# Patient Record
Sex: MALE | Race: BLACK OR AFRICAN AMERICAN | Employment: FULL TIME | URBAN - METROPOLITAN AREA
[De-identification: names, ages, dates, MRNs, and addresses within clinical notes are randomized per-mention and may not be internally consistent; named-entity substitution may affect disease eponyms.]

---

## 2019-04-30 ENCOUNTER — HOSPITAL ENCOUNTER (EMERGENCY)
Age: 24
Discharge: HOME | End: 2019-04-30
Payer: COMMERCIAL

## 2019-04-30 ENCOUNTER — APPOINTMENT (OUTPATIENT)
Dept: GENERAL RADIOLOGY | Age: 24
End: 2019-04-30
Payer: COMMERCIAL

## 2019-04-30 VITALS
HEIGHT: 69 IN | BODY MASS INDEX: 44.43 KG/M2 | OXYGEN SATURATION: 97 % | RESPIRATION RATE: 18 BRPM | DIASTOLIC BLOOD PRESSURE: 86 MMHG | HEART RATE: 70 BPM | TEMPERATURE: 97.8 F | WEIGHT: 300 LBS | SYSTOLIC BLOOD PRESSURE: 134 MMHG

## 2019-04-30 DIAGNOSIS — M77.8 RIGHT WRIST TENDONITIS: Primary | ICD-10-CM

## 2019-04-30 PROCEDURE — 99283 EMERGENCY DEPT VISIT LOW MDM: CPT

## 2019-04-30 PROCEDURE — 73130 X-RAY EXAM OF HAND: CPT

## 2019-04-30 PROCEDURE — 73110 X-RAY EXAM OF WRIST: CPT

## 2019-04-30 PROCEDURE — 6370000000 HC RX 637 (ALT 250 FOR IP): Performed by: PHYSICIAN ASSISTANT

## 2019-04-30 RX ORDER — PREDNISONE 20 MG/1
60 TABLET ORAL ONCE
Status: COMPLETED | OUTPATIENT
Start: 2019-04-30 | End: 2019-04-30

## 2019-04-30 RX ORDER — PREDNISONE 20 MG/1
40 TABLET ORAL EVERY MORNING
Qty: 10 TABLET | Refills: 0 | Status: SHIPPED | OUTPATIENT
Start: 2019-04-30 | End: 2019-05-05

## 2019-04-30 RX ADMIN — PREDNISONE 60 MG: 20 TABLET ORAL at 17:12

## 2019-04-30 ASSESSMENT — PAIN DESCRIPTION - ORIENTATION: ORIENTATION: RIGHT

## 2019-04-30 ASSESSMENT — PAIN DESCRIPTION - LOCATION: LOCATION: HAND;WRIST

## 2019-04-30 ASSESSMENT — PAIN SCALES - GENERAL: PAINLEVEL_OUTOF10: 9

## 2019-04-30 ASSESSMENT — PAIN DESCRIPTION - DESCRIPTORS: DESCRIPTORS: SHARP

## 2019-04-30 ASSESSMENT — PAIN DESCRIPTION - PAIN TYPE: TYPE: ACUTE PAIN

## 2019-04-30 NOTE — ED PROVIDER NOTES
Independent:      HPI:  4/30/19, Time: 4:45PM.       Darlin Merino is a 22 y.o. male presenting to the ED for evaluation of right hand and wrist pain and swelling onset over the last 24 hours. 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 He did this repetitively his entire shift and yesterday was his first day. He reports that he has been having persistent pain and swelling to his right hand and wrist and he called his company and they advised to get himself checked. He denies any fall, trauma or injury. Her reports no numbness or tingling to his right hand. The complaint has been persistent, moderate in severity, and worsened by movement and palpation. ROS:   Pertinent positives and negatives are stated within the HPI, all other systems reviewed and are negative.    --------------------------------------------- PAST HISTORY ---------------------------------------------  Past Medical History:  has a past medical history of Asthma. Past Surgical History:  has no past surgical history on file. Social History:  reports that he is a non-smoker but has been exposed to tobacco smoke. He has never used smokeless tobacco. He reports that he does not drink alcohol or use drugs. Family History: family history is not on file. The patients home medications have been reviewed. Allergies: Patient has no known allergies. -------------------------------------------------- RESULTS -------------------------------------------------  All laboratory and radiology results have been personally reviewed by myself   LABS:  No results found for this visit on 04/30/19. RADIOLOGY:  Interpreted by Radiologist.  XR HAND RIGHT (MIN 3 VIEWS)   Final Result   No radiographic evidence of acute osseous abnormality or   fracture. . If there is persistent clinical pain or symptomatology, a   return to medical attention within 2-7 days and further imaging is   recommended. XR WRIST RIGHT (MIN 3 VIEWS)   Final Result      NO ACUTE FRACTURE OR DISLOCATION RIGHT WRIST.              ------------------------- NURSING NOTES AND VITALS REVIEWED ---------------------------   The nursing notes within the ED encounter and vital signs as below have been reviewed. /86   Pulse 70   Temp 97.8 °F (36.6 °C) (Oral)   Resp 18   Ht 5' 9\" (1.753 m)   Wt 300 lb (136.1 kg)   SpO2 97%   BMI 44.30 kg/m²   Oxygen Saturation Interpretation: Normal    ---------------------------------------------------PHYSICAL EXAM--------------------------------------      Constitutional/General: Alert and oriented x3, well appearing, non toxic in NAD  Head: NC/AT  Eyes: PERRL, EOMI  Neck: Supple, full ROM  Pulmonary: Lungs clear to auscultation bilaterally,  Not in respiratory distress  Cardiovascular:  Regular rate and rhythm,  2+ distal pulses  Extremities: Moves all extremities x 4. Local tenderness to right hand and right wrist with marked STS noted. Capillary refill brisk and sensory intact to all digits of his right hand. No tenderness to medial aspect of right wrist anatomical snuffbox. Patient able to supinate and pronate right hand with some discomfort. Warm and well perfused  Skin: warm and dry without rash  Neurologic: GCS 15  Psych: Normal Affect    ------------------------------ ED COURSE/MEDICAL DECISION MAKING----------------------  Medications   predniSONE (DELTASONE) tablet 60 mg (60 mg Oral Given 4/30/19 1712)       Medical Decision Making:    Patient to ER, complaints of right hand and right wrist pain and swelling onset since new job yesterday. Recommend to check xrays of right wrist and right hand. Patient advised of stable x-ray findings. Recommended ice, elevate, oral prednisone, wrist velcro splint. Recommend close follow up with [facility], call for appointment tomorrow.     Recommend Prednisone once every morning for 5 days, ice and work restrictions given. Counseling: The emergency provider has spoken with the patient and discussed todays results, in addition to providing specific details for the plan of care and counseling regarding the diagnosis and prognosis. Questions are answered at this time and they are agreeable with the plan.      --------------------------------- IMPRESSION AND DISPOSITION ---------------------------------    IMPRESSION  1.  Right wrist tendonitis        DISPOSITION  Disposition: Discharge to home  Patient condition is stable        [place], [place]  04/30/19 8815

## 2019-05-20 ENCOUNTER — HOSPITAL ENCOUNTER (OUTPATIENT)
Dept: OCCUPATIONAL THERAPY | Age: 24
Setting detail: THERAPIES SERIES
Discharge: HOME | End: 2019-05-20
Payer: COMMERCIAL

## 2019-05-20 NOTE — PROGRESS NOTES
OCCUPATIONAL THERAPY INITIAL EVALUATION     Phone: [phone]  Fax: [phone]     Date:  2019  Initial Evaluation Date: 19    Patient Name:  Angelika Beckman    :  1995    Restrictions/Precautions:  none, low fall risk  Diagnosis:  Strain R thumb (V56.942F)      Insurance/Certification information:    Referring Practitioner:  Dr. Noy Pacheco  Date of Surgery/Injury: 19  Plan of care signed (Y/N):  no  Visit# / total visits:    End date 19    Past Medical History:   Past Medical History:   Diagnosis Date    Asthma     exercise induced     Past Surgical History: No past surgical history on file. Reason for Referral: pt. Reports while lifting a piece of ham at [employer] wrist gave out and began feeling thumb and wrist pain in the right hand. Home Living: lives with mother. Prior Level of Function: independent    Pain Level: 5 on scale of 1-10, aching    Cognition:   Alert/Oriented x3     IADL History  Homemaking Responsibility: mod ind. With less use of the right hand. Shopping Responsibility: mod ind. Mode of Transportation: pt drives  Leisure & Hobbies: limited  Work: pt. Not working. ADL  Feeding:  ind.  Grooming:  ind  Bathing:  ind  UE Dressing:  ind  LE Dressing:  ind  Toileting:  ind  Transfer:  ind    UE Assessment:    Comment: Hand Dominance is right    Sensation: intact to light touch. Circumferential Measurements:            No swelling is noted. Dynamometer (setting 2): Pt. Reports he is unable to lift anything heavy. If he does he feels increased pain in wrist area.     Left: 80#      Right: 118#    Pinch Meter:   Lateral: Left= 21#, Right= 20#    Palmar 3 point: Left= 14#, Right= 19#  9 Hole Peg Test:   Left: WNL   Right: WNL    Intervention: fluidotherapy    Assessment of current deficits   Functional mobility []  ADLs [x] Strength [x]  Cognition []  Functional transfers  [] IADLs [] Safety Awareness [x]  Endurance [x]  Fine Motor right hand without pain. 3) Patient will report ADL functions same as prior to diagnosis of thumb and wrist strain. Shawna Jenkins 4)Patient will demonstrate improved functional activity tolerance from mild to max for ADL/IADL completion. Patient. Education:  [x] Plans/Goals, Risks/Benefits discussed  [x] Home exercise program  Method of Education: [x] Verbal  [] Demo  [] Written  Comprehension of Education:  [x] Verbalizes understanding. [x] Demonstrates understanding. [] Needs Review. [] Demonstrates/verbalizes understanding of HEP/Ed previously given. Patient understands diagnosis/prognosis and consents to treatment, plan and goals: [x] Yes    [] No      Electronically signed by: Meghann REICH CHT [id number]      Physician's Certification / Comments      Frequency/Duration 2 / week for 6 visits. Certification period From: 5/3/19  To: 6/30/19     I have reviewed the Plan of Care established for skilled therapy services and certify that the services are required and that they will be provided while the patient is under my care. Physician's Comments/Revisions:                   Practioner's Printed Name:  Meghann REICH CHT                               Physician's Signature:                                                               Date: 5/20/19         Please review Patient's OT evaluation and if you agree sign/date and fax back to us at our [address] [place] [address] fax # [phone].

## 2019-05-23 ENCOUNTER — HOSPITAL ENCOUNTER (OUTPATIENT)
Dept: OCCUPATIONAL THERAPY | Age: 24
Setting detail: THERAPIES SERIES
Discharge: HOME | End: 2019-05-23
Payer: COMMERCIAL

## 2019-05-23 PROCEDURE — 97110 THERAPEUTIC EXERCISES: CPT

## 2019-05-23 PROCEDURE — 97530 THERAPEUTIC ACTIVITIES: CPT

## 2019-05-23 PROCEDURE — 97140 MANUAL THERAPY 1/> REGIONS: CPT

## 2019-05-23 NOTE — PROGRESS NOTES
OCCUPATIONAL THERAPY PROGRESS NOTE      [facility]   Phone: [phone]   Fax: [phone]     Date:  2019  Initial Evaluation Date:  2019    Patient Name:  Roseanne Lau    :  1995  Diagnosis:  Strain R thumb   (K51.097Z)                                            Insurance/Certification information:  [facility]  [id number]  To 2019  12 visits  Referring Practitioner:  Dr. Benjamin Berrios  Date of Surgery/Injury: 19  Plan of care signed (Y/N):  no  Visit# / total visits:    End date 19     Pain Level: moderate, aching and tight (pulling)    Subjective: \"It locks up on me at times. \"     Objective:  Engaged patient in the following with focus on ROM, pain control, edema management. INTERVENTION: COMPLETED REPS/TIME: SPECIFICS/COMMENTS:   Modality:      Fluidotherapy x 15 SROM  AROM   Paraffin      MHP      Estim      Manual Therapy:      Scar Massage      Soft Tissue: x 20 min MFR  Cross friction   Therapeutic Ex/Activities: Towel Task x 12 reps    Prayer Stretches x 10 reps                                                                ROM:      Tendon Glided x 10 reps    Nerve Glides x 10 reps    Thumb x 5 min Abd/add flex/ext  AROM                 Assessment: Patient shows potential to progress with stated goals and will be educated on HEP and provided written handouts as needed throughout their care. Pt is making Fair progress toward stated plan of care. -Rehab Potential: Good  -Requires OT Follow Up: Yes  -Time In: 1245  -Time Out: 145   Timed Treatment Minutes: 55 Minutes  Goals: Goals for pt can be see on initial eval occurring on 2019    Plan:   [x]  Continues Plan of care: Treatment delivered based on POC and graduated to patient's progress. Patient education continues at each visit to obtain maximum benefit from skilled OT intervention.   []  Alter Plan of care:   []  Discharge:      LIBBY Pickard/ANKUR, [address]

## 2019-05-29 ENCOUNTER — HOSPITAL ENCOUNTER (OUTPATIENT)
Dept: OCCUPATIONAL THERAPY | Age: 24
Setting detail: THERAPIES SERIES
End: 2019-05-29
Payer: COMMERCIAL

## 2019-06-11 ENCOUNTER — HOSPITAL ENCOUNTER (OUTPATIENT)
Dept: OCCUPATIONAL THERAPY | Age: 24
Setting detail: THERAPIES SERIES
Discharge: HOME | End: 2019-06-11
Payer: COMMERCIAL

## 2019-06-11 NOTE — PROGRESS NOTES
OCCUPATIONAL THERAPY DEPARTMENT    LETTER OF DISCHARGE NOTIFICATION    6/11/2019    Dear Dr. Hernan Rahman  : This is to inform you that, as per [address] [facility] your patient, Darlin Merino, [id number],   is as of todays date being discharged from Occupational Therapy secondary to the following reasons:      1. If an Outpatient Occupational Therapy patient misses three consecutive scheduled appointments without any prior notification, he or she will be discharged from Occupational Therapy services. A new prescription will be necessary to resume Occupational Therapy. 2.  If a client is absent for 50% of scheduled visits during a one-month period, he or she will be discharged from Occupational Therapy services. A new prescription will be necessary to resume Occupational Therapy. 3.  Due to their poor attendence, current goal status was not available. He attended the initial evaluation and one visit of 12 scheduled. Missed 3+. If you have any questions, feel free to call us at [address] [place] [address], [phone]. Thank you     Lyssa Torrez, [id number] [place] [address], [address]  [phone] (FAX)      ______________________________  ___________  Physician      Date    I have read the above notification and understand this discharge of POC.

## 2019-10-20 ENCOUNTER — HOSPITAL ENCOUNTER (EMERGENCY)
Age: 24
Discharge: HOME | End: 2019-10-20
Attending: EMERGENCY MEDICINE

## 2019-10-20 VITALS
DIASTOLIC BLOOD PRESSURE: 82 MMHG | SYSTOLIC BLOOD PRESSURE: 130 MMHG | RESPIRATION RATE: 18 BRPM | BODY MASS INDEX: 44.43 KG/M2 | HEART RATE: 83 BPM | HEIGHT: 69 IN | TEMPERATURE: 98 F | OXYGEN SATURATION: 98 % | WEIGHT: 300 LBS

## 2019-10-20 DIAGNOSIS — K29.00 OTHER ACUTE GASTRITIS WITHOUT HEMORRHAGE: Primary | ICD-10-CM

## 2019-10-20 PROCEDURE — 96375 TX/PRO/DX INJ NEW DRUG ADDON: CPT

## 2019-10-20 PROCEDURE — 6360000002 HC RX W HCPCS: Performed by: EMERGENCY MEDICINE

## 2019-10-20 PROCEDURE — 96374 THER/PROPH/DIAG INJ IV PUSH: CPT

## 2019-10-20 PROCEDURE — 6370000000 HC RX 637 (ALT 250 FOR IP): Performed by: EMERGENCY MEDICINE

## 2019-10-20 PROCEDURE — 99283 EMERGENCY DEPT VISIT LOW MDM: CPT

## 2019-10-20 PROCEDURE — 2580000003 HC RX 258: Performed by: EMERGENCY MEDICINE

## 2019-10-20 RX ORDER — ONDANSETRON 2 MG/ML
4 INJECTION INTRAMUSCULAR; INTRAVENOUS ONCE
Status: COMPLETED | OUTPATIENT
Start: 2019-10-20 | End: 2019-10-20

## 2019-10-20 RX ORDER — PANTOPRAZOLE SODIUM 40 MG/1
40 TABLET, DELAYED RELEASE ORAL ONCE
Status: COMPLETED | OUTPATIENT
Start: 2019-10-20 | End: 2019-10-20

## 2019-10-20 RX ORDER — 0.9 % SODIUM CHLORIDE 0.9 %
1000 INTRAVENOUS SOLUTION INTRAVENOUS ONCE
Status: COMPLETED | OUTPATIENT
Start: 2019-10-20 | End: 2019-10-20

## 2019-10-20 RX ORDER — KETOROLAC TROMETHAMINE 30 MG/ML
30 INJECTION, SOLUTION INTRAMUSCULAR; INTRAVENOUS ONCE
Status: COMPLETED | OUTPATIENT
Start: 2019-10-20 | End: 2019-10-20

## 2019-10-20 RX ADMIN — LIDOCAINE HYDROCHLORIDE: 20 SOLUTION ORAL; TOPICAL at 03:15

## 2019-10-20 RX ADMIN — KETOROLAC TROMETHAMINE 30 MG: 30 INJECTION, SOLUTION INTRAMUSCULAR; INTRAVENOUS at 04:20

## 2019-10-20 RX ADMIN — SODIUM CHLORIDE 1000 ML: 9 INJECTION, SOLUTION INTRAVENOUS at 04:17

## 2019-10-20 RX ADMIN — ONDANSETRON 4 MG: 2 INJECTION INTRAMUSCULAR; INTRAVENOUS at 04:20

## 2019-10-20 RX ADMIN — PANTOPRAZOLE SODIUM 40 MG: 40 TABLET, DELAYED RELEASE ORAL at 03:39

## 2019-10-20 ASSESSMENT — PAIN DESCRIPTION - FREQUENCY: FREQUENCY: INTERMITTENT

## 2019-10-20 ASSESSMENT — PAIN DESCRIPTION - ORIENTATION: ORIENTATION: UPPER

## 2019-10-20 ASSESSMENT — PAIN DESCRIPTION - LOCATION: LOCATION: ABDOMEN

## 2019-10-20 ASSESSMENT — PAIN SCALES - GENERAL
PAINLEVEL_OUTOF10: 6
PAINLEVEL_OUTOF10: 6
PAINLEVEL_OUTOF10: 8

## 2021-11-14 ENCOUNTER — HOSPITAL ENCOUNTER (EMERGENCY)
Age: 26
Discharge: LEFT BEFORE DISCHARGE | End: 2021-11-14

## 2021-11-14 ENCOUNTER — APPOINTMENT (OUTPATIENT)
Dept: GENERAL RADIOLOGY | Age: 26
End: 2021-11-14

## 2021-11-14 VITALS
BODY MASS INDEX: 41.52 KG/M2 | SYSTOLIC BLOOD PRESSURE: 146 MMHG | HEIGHT: 70 IN | WEIGHT: 290 LBS | RESPIRATION RATE: 16 BRPM | HEART RATE: 98 BPM | TEMPERATURE: 97 F | OXYGEN SATURATION: 98 % | DIASTOLIC BLOOD PRESSURE: 90 MMHG

## 2021-11-14 DIAGNOSIS — Z20.822 EXPOSURE TO COVID-19 VIRUS: Primary | ICD-10-CM

## 2021-11-14 DIAGNOSIS — Z20.822 ENCOUNTER FOR LABORATORY TESTING FOR COVID-19 VIRUS: ICD-10-CM

## 2021-11-14 PROCEDURE — 99284 EMERGENCY DEPT VISIT MOD MDM: CPT

## 2021-11-14 PROCEDURE — 71045 X-RAY EXAM CHEST 1 VIEW: CPT

## 2021-11-14 PROCEDURE — U0003 INFECTIOUS AGENT DETECTION BY NUCLEIC ACID (DNA OR RNA); SEVERE ACUTE RESPIRATORY SYNDROME CORONAVIRUS 2 (SARS-COV-2) (CORONAVIRUS DISEASE [COVID-19]), AMPLIFIED PROBE TECHNIQUE, MAKING USE OF HIGH THROUGHPUT TECHNOLOGIES AS DESCRIBED BY CMS-2020-01-R: HCPCS

## 2021-11-14 PROCEDURE — U0005 INFEC AGEN DETEC AMPLI PROBE: HCPCS

## 2021-11-14 NOTE — Clinical Note
Amita Ledezma was seen and treated in our emergency department on 11/14/2021. COVID19 virus is suspected. Per the CDC guidelines we recommend home isolation until the following conditions are all met:    1. At least 10 days have passed since symptoms first appeared and  2. At least 24 hours have passed since last fever without the use of fever-reducing medications and  3. Symptoms (e.g., cough, shortness of breath) have improved    If you have any questions or concerns, please don't hesitate to call.     He may return to work/school on 11/23/2021        Julius Leon, NARESH - CNP

## 2021-11-14 NOTE — ED PROVIDER NOTES
[address]  Department of Emergency Medicine   ED  Encounter Note  Admit Date/RoomTime: 2021  6:29 PM  ED Room:     NAME: Josie Davis  : 1995  MRN: [id number]     Chief Complaint:  Concern For COVID-19 (roommate tested positive today, patient concerned nd wants tested, co slight cough  no other symptoms)    History of Present Illness       Josie Davis is a 23 y.o. old male who presents to the emergency department for COVID concern with symptoms beginning 1 day(s) prior to arrival. He have been taking no OTC intervention to alleviate their symptoms which seem to be aggravated by cold air. Symptoms are mild in severity and have been persistent in nature with the following pertinent positive and negatives:       Fever/Chills/Malaise    No      Rhinorrhea/Congestion    Yes      Loss taste or smell    No      Sore throat    Yes      Cough    Yes, productive clear-white      N/V/D/Abdominal pain    No      Syncope/CP/SOB/     Hemoptysis/Leg swelling    No       COVID-19 High- Risk Factors:     DM:    No      HTN:    No      CAD/CHF    No      Lung disease: Yes      Kidney disease:    No      CA/immunosuppressed:    No      Pregnant:    N/A      KNOWN EXPOSURE TO COVID-19: roommate is positive  COVID-19 VACCINE STATUS: none  EMPLOYMENT: grocery     ROS   Pertinent positives and negatives are stated within HPI, all other systems reviewed and are negative. Past Medical History:  has a past medical history of Asthma. Surgical History:  has no past surgical history on file. Social History:  reports that he is a non-smoker but has been exposed to tobacco smoke. He has never used smokeless tobacco. He reports that he does not drink alcohol and does not use drugs. Family History: family history is not on file. Allergies: Patient has no known allergies.     Physical Exam   Oxygen Saturation Interpretation: Normal on room air analysis. ED Triage Vitals   BP Temp Temp Source Pulse Resp SpO2 Height Weight   11/14/21 1826 11/14/21 1826 11/14/21 1826 11/14/21 1826 11/14/21 1826 11/14/21 1826 11/14/21 1838 11/14/21 1838   (!) 167/94 97 °F (36.1 °C) Temporal 98 16 98 % 5' 10\" (1.778 m) 290 lb (131.5 kg)         · Constitutional:  Alert, development consistent with age. No apparent distress. · Ears:  External Ears: No pain on manipulation of the tragus and pinna bilaterally. No mastoid tenderness bilaterally. TM's & External Canals: normal TM's and external ear canals bilaterally. · Nose:   There is clear rhinorrhea, mucosal erythema and mucosal edema. · Mouth:  No buccal lesions. Mucosa moist.   · Throat: Airway Patent. No tonsillar hypertrophy, erythema or exudate. No palatal petechiae or PTA. · Neck:  Supple. There is no preauricular, anterior cervical and posterior cervical node tenderness. · Respiratory:   Lung sounds clear and equal bilaterally. No wheezing or rhonchi. . No stridor or respiratory distress. · CV:  Regular rate and rhythm, no murmur. No resting tachycardia. Strong radial pulse. · GI:  Active BS. Abdomen soft, non-tender, non-distended. No firm or pulsatile mass. · Integument:  Normal turgor and normal capillary refill. No cyanosis. Warm and dry without visible rash. No calf tenderness, palpable cords or localized foot/ankle edema bilaterally. · Neurological:  Alert and oriented. Motor functions intact. Full sensation. Lab / Imaging Results   (All laboratory and radiology results have been personally reviewed by myself)  Labs:  No results found for this visit on 11/14/21. Imaging: All Radiology results interpreted by Radiologist unless otherwise noted. XR CHEST PORTABLE   Final Result   No acute process.              ED Course / Medical Decision Making   Medications - No data to display     Re-examination:  11/14/21       Time: 1910   Patients condition remains unchanged and remains stable. Discussed results and treatment plan. Consults:   None    Procedures:   none    Medical Decision Making:     Brayden Jo presented to ED with complaints of Concern For COVID-19 (roommate tested positive today, patient concerned nd wants tested, co slight cough  no other symptoms)      Imaging was done. Interpretation as per radiologist shows no acute process. He is not hypoxic. There is high suspicion for COVID-19, therefore, testing was obtained and results are pending. Patient does have a history of asthma however has had no wheezing and none on clinical exam therefore was not started on steroids. He denies need for an albuterol inhaler refill. Based on the history and physical examination findings, the causative nature of illness is likely to be COVID-19 in etiology. Therefore, symptomatic control with supportive meds, bronchodilators PRN and recommend self quarantine for 10 days are considered appropriate at this time. Patient is well appearing, non toxic and appropriate for outpatient management as listed below:    Normal progression of disease discussed. All questions answered. Explained the rationale for symptomatic treatment rather than use of an antibiotic. Instruction provided in the use of fluids, vaporizer, acetaminophen, and other OTC medication for symptom control. Extra fluids  Analgesics as needed, dosages and times reviewed. Denies need for refill of albuterol. No wheezing on exam therefore no steroids. Follow-up with primary care provider in 10 days, or sooner should symptoms worsen. Be sure to have a BP recheck as well and patient aware of his elevated BP. Explained specific instructions on when to return for re-evaluation should symptoms or condition worsen  Recommend quarantine for 10 days.   Monoclonal Antibody Form faxed after patient shows interest.    Patient verbalizes understanding of instructions, is amenable to this outpatient management plan and departed in stable condition. Assessment     1. Exposure to COVID-19 virus    2. Encounter for laboratory testing for COVID-19 virus      Plan   Discharged home. Patient condition is stable    New Medications     New Prescriptions    No medications on file     Electronically signed by NARESH Goldberg CNP   DD: 11/14/21  **This report was transcribed using voice recognition software. Every effort was made to ensure accuracy; however, inadvertent computerized transcription errors may be present.   END OF ED PROVIDER NOTE       NARESH Goldberg CNP  11/14/21 9095

## 2021-11-15 PROBLEM — U07.1 COVID: Status: ACTIVE | Noted: 2021-11-15

## 2021-11-16 LAB — SARS-COV-2, PCR: NOT DETECTED

## 2023-09-13 ENCOUNTER — HOSPITAL ENCOUNTER (EMERGENCY)
Age: 28
Discharge: HOME | End: 2023-09-13
Attending: EMERGENCY MEDICINE

## 2023-09-13 ENCOUNTER — APPOINTMENT (OUTPATIENT)
Dept: GENERAL RADIOLOGY | Age: 28
End: 2023-09-13

## 2023-09-13 VITALS
TEMPERATURE: 97.9 F | DIASTOLIC BLOOD PRESSURE: 83 MMHG | OXYGEN SATURATION: 98 % | HEIGHT: 69 IN | HEART RATE: 55 BPM | SYSTOLIC BLOOD PRESSURE: 154 MMHG | BODY MASS INDEX: 31.84 KG/M2 | WEIGHT: 215 LBS | RESPIRATION RATE: 16 BRPM

## 2023-09-13 DIAGNOSIS — S93.402A SPRAIN OF LEFT ANKLE, UNSPECIFIED LIGAMENT, INITIAL ENCOUNTER: Primary | ICD-10-CM

## 2023-09-13 DIAGNOSIS — R03.0 ELEVATED BLOOD PRESSURE READING: ICD-10-CM

## 2023-09-13 DIAGNOSIS — S93.602A FOOT SPRAIN, LEFT, INITIAL ENCOUNTER: ICD-10-CM

## 2023-09-13 PROCEDURE — 99283 EMERGENCY DEPT VISIT LOW MDM: CPT

## 2023-09-13 PROCEDURE — 6370000000 HC RX 637 (ALT 250 FOR IP): Performed by: EMERGENCY MEDICINE

## 2023-09-13 PROCEDURE — 73610 X-RAY EXAM OF ANKLE: CPT

## 2023-09-13 PROCEDURE — 73630 X-RAY EXAM OF FOOT: CPT

## 2023-09-13 RX ORDER — IBUPROFEN 800 MG/1
800 TABLET ORAL EVERY 8 HOURS PRN
Qty: 15 TABLET | Refills: 0 | Status: SHIPPED | OUTPATIENT
Start: 2023-09-13 | End: 2023-09-18

## 2023-09-13 RX ORDER — IBUPROFEN 600 MG/1
600 TABLET ORAL ONCE
Status: COMPLETED | OUTPATIENT
Start: 2023-09-13 | End: 2023-09-13

## 2023-09-13 RX ADMIN — IBUPROFEN 600 MG: 600 TABLET ORAL at 01:04

## 2023-09-13 ASSESSMENT — PAIN DESCRIPTION - ONSET
ONSET: ON-GOING
ONSET: PROGRESSIVE

## 2023-09-13 ASSESSMENT — PAIN - FUNCTIONAL ASSESSMENT
PAIN_FUNCTIONAL_ASSESSMENT: PREVENTS OR INTERFERES WITH ALL ACTIVE AND SOME PASSIVE ACTIVITIES
PAIN_FUNCTIONAL_ASSESSMENT: 0-10

## 2023-09-13 ASSESSMENT — PAIN DESCRIPTION - LOCATION
LOCATION: FOOT

## 2023-09-13 ASSESSMENT — PAIN SCALES - GENERAL
PAINLEVEL_OUTOF10: 7
PAINLEVEL_OUTOF10: 8
PAINLEVEL_OUTOF10: 7

## 2023-09-13 ASSESSMENT — PAIN DESCRIPTION - ORIENTATION
ORIENTATION: LEFT

## 2023-09-13 ASSESSMENT — LIFESTYLE VARIABLES
HOW MANY STANDARD DRINKS CONTAINING ALCOHOL DO YOU HAVE ON A TYPICAL DAY: PATIENT DOES NOT DRINK
HOW OFTEN DO YOU HAVE A DRINK CONTAINING ALCOHOL: NEVER

## 2023-09-13 ASSESSMENT — PAIN DESCRIPTION - DESCRIPTORS
DESCRIPTORS: SHARP;PINS AND NEEDLES

## 2023-09-13 ASSESSMENT — PAIN DESCRIPTION - PAIN TYPE
TYPE: ACUTE PAIN
TYPE: ACUTE PAIN

## 2023-09-13 ASSESSMENT — PAIN DESCRIPTION - FREQUENCY
FREQUENCY: INTERMITTENT
FREQUENCY: CONTINUOUS

## 2023-09-13 NOTE — DISCHARGE INSTRUCTIONS
NOTE:  See the Foot/Ankle Specialist listed below (Dr. Shaan Yin) for follow-up regarding your foot/ankle injury. Get immediate medical attention if any new/worsening symptoms occur. Because of your elevated blood pressure reading tonight (/83), you are referred to your primary care provider Michelle Valdez) for a blood pressure recheck in 3-4 days.

## 2025-05-06 ENCOUNTER — HOSPITAL ENCOUNTER (EMERGENCY)
Age: 30
Discharge: LEFT BEFORE DISCHARGE | End: 2025-05-06

## 2025-05-06 VITALS
RESPIRATION RATE: 16 BRPM | HEIGHT: 70 IN | SYSTOLIC BLOOD PRESSURE: 142 MMHG | DIASTOLIC BLOOD PRESSURE: 82 MMHG | TEMPERATURE: 97.5 F | WEIGHT: 200 LBS | BODY MASS INDEX: 28.63 KG/M2 | OXYGEN SATURATION: 99 % | HEART RATE: 57 BPM

## 2025-05-06 DIAGNOSIS — Z53.21 PATIENT LEFT WITHOUT BEING SEEN: Primary | ICD-10-CM

## 2025-05-06 ASSESSMENT — PAIN - FUNCTIONAL ASSESSMENT
PAIN_FUNCTIONAL_ASSESSMENT: PREVENTS OR INTERFERES SOME ACTIVE ACTIVITIES AND ADLS
PAIN_FUNCTIONAL_ASSESSMENT: 0-10

## 2025-05-06 ASSESSMENT — PAIN DESCRIPTION - ONSET: ONSET: ON-GOING

## 2025-05-06 ASSESSMENT — PAIN SCALES - GENERAL: PAINLEVEL_OUTOF10: 10

## 2025-05-06 ASSESSMENT — PAIN DESCRIPTION - LOCATION: LOCATION: TEETH

## 2025-05-06 ASSESSMENT — PAIN DESCRIPTION - FREQUENCY: FREQUENCY: CONTINUOUS

## 2025-05-06 ASSESSMENT — PAIN DESCRIPTION - DESCRIPTORS: DESCRIPTORS: ACHING;THROBBING

## 2025-05-06 NOTE — ED NOTES
I personally went to go and assess and see the patient in the exam room patient was missing.  Staff reported that the patient eloped and hence has left without being seen.     Yoly Martinez, APRN - CNP  05/06/25 1049